# Patient Record
(demographics unavailable — no encounter records)

---

## 2025-03-26 NOTE — ASSESSMENT
[FreeTextEntry1] : 1. sinus pressure -medrol dose nuzhat -f/u with dentist to r/o dental disease etiology

## 2025-03-26 NOTE — HISTORY OF PRESENT ILLNESS
[de-identified] : 62 y/o F p/w recurrent sinusitis for several months. She had a cold prior to flight to [facility] in November. Was seen by Dr. Arango and was dx'd with sinusitis, tx'd with OTC therapy. Symptoms did not resolve, returned in January to Dr. Arango and was tx'd with amoxicillin with no relief. She then returned to Dr. Arango, CT was ordered which revealed "foamy secretion in R sphenoid sinus with superimposed mild polypoid mucosal thickening, r DNS". She then was placed on ceftin. She still felt persistent rhinorrhea and sinus pain, was switched to septra abx with no relief. She then returned to Dr. Arango and was advised on sinus surgery (FESS, ethmoid). She was then placed on doxycyline on day 3/7. Reports in general she has a constant r maxillary sinus pressure, chronic nasal congestion. Uses flonase daily and sudafed occasionally. She is here for a second opinion regarding surgery. Reports last dental film was several years ago. States that she still has her wisdom teeth. Denies anosmia, h/o seasonal allergies, headaches

## 2025-04-30 NOTE — ASSESSMENT
[FreeTextEntry1] : 1. allergic rhinitis -continue nasonex -start allegra -continue saline rinses  2. sinusitis -resolved

## 2025-04-30 NOTE — HISTORY OF PRESENT ILLNESS
[de-identified] : 1 mo follow up visit for this 62 y/o F with h/o recurrent sinusitis. States that symptoms improved after taking medrol dose nuzhat. Reports that she started to feel nasal congestion, rhinorrhea, scratchy throat on Sunday. Denies h/o seasonal allergies. Currently taking nasonex, using saline rinse. Reports cough worsening at night time.